# Patient Record
(demographics unavailable — no encounter records)

---

## 2017-06-16 NOTE — MAMMOGRAPHY REPORT
DIGITAL SCREENING MAMMOGRAM: 06/15/2017

 

CLINICAL INDICATION: A 67-year-old with history of late childbearing, for screening. 

 

COMPARISON: 06/2014, 01/2013, 08/2010, 08/2009, 06/2008, 05/2007. 

 

TECHNIQUE:  Routine CC and MLO projections were obtained of the breasts.  

 

FINDINGS:  Parenchymal tissue within the breasts is predominantly fatty replaced. There are no domina
nt masses, suspicious microcalcifications, or secondary signs of malignancy. In comparison to the pre
vious studies, there are no significant changes.

 

IMPRESSION:  NO MAMMOGRAPHIC EVIDENCE OF MALIGNANCY. NO SIGNIFICANT INTERVAL CHANGES. 

 

RECOMMENDATION:  Screening mammography is recommended annually. 

 

BIRADS category 1 - negative.

 

STANDARD QUALIFYING STATEMENTS

1. This examination was reviewed with the aid of Computed-Aided Detection (CAD).

2. A negative or benign imaging report should not delay biopsy if clinically suspicious findings are 
present. Consider surgical consultation if warranted. More than 5% of cancers are not identified by i
maging.

3. Dense breasts may obscure an underlying neoplasm.

 

 

 

DD:06/16/2017 12:55:21  DT: 06/16/2017 13:02  JOB #: [id number]  EXT JOB #:[id number]